# Patient Record
(demographics unavailable — no encounter records)

---

## 2024-10-30 NOTE — HISTORY OF PRESENT ILLNESS
[FreeTextEntry1] : Sandra presents to the office today with h/o midline cystocele, OAB and recurrent UTI. Pt has a RS#3 in place. Reports good support with pessary. Denies pelvic pan, pressure, or vaginal bleeding. Denies any problems with urination or BM. Denies sensation of incomplete bladder emptying with pessary in place. Pt states three weeks ago had painful urination, cloudy urine and lower back pain. Pt states received an epidural last week and lower back pain, and painful urination resolved. Pt states cloudy urine remains and has vaginal itching. Denies fever, chills, dysuria, hematuria or flank pain. Pt was on Gemtesa for OAB but d/alfredo 8/16/24 due to lack of improvement and possible urinary retention. Pt is not able to void when she is evaluated in the office, so a true PVR has not been obtained. Pt had UDS on 9/26/24 which showed large capacity with diminished sensation, positive leaks with cough, no DO or UUI. Pt does not want surgical correction of prolapse or DEBBIE. Pt reports most bothersome issue is recurrent UTIs. Pt takes cranberry tablets and uses Replens.  Chart reviewed: 7/18/24: >100,000 CFU/ml Escherichia coli treated with Bactrim 6/28/24: >100,000 CFU/ml Escherichia coli treated with Bactrim 4/8/24: >100,000 CFU/ml Candida glabrata "Susceptibilities not performed" treated with Bactrim 3/22/24 CT abd/pelvis: No hydronephrosis or obstructing ureteral calculus. Right greater than left mild renal cortical scarring.

## 2024-10-30 NOTE — PHYSICAL EXAM
[No Acute Distress] : in no acute distress [Well developed] : well developed [Well Nourished] : ~L well nourished [Good Hygeine] : demonstrates good hygeine [Oriented x3] : oriented to person, place, and time [Normal Memory] : ~T memory was ~L unimpaired [Normal Mood/Affect] : mood and affect are normal [Cough] : no cough [None] : no CVA tenderness [Warm and Dry] : was warm and dry to touch [Normal Gait] : gait was normal [Vulvar Atrophy] : vulvar atrophy [Labia Majora] : were normal [Labia Minora] : were normal [Normal] : was normal [Normal Appearance] : general appearance was normal [Atrophy] : atrophy [Discharge] : a  ~M vaginal discharge was present [Foul Smelling] : not foul smelling [White] : white [Scant] : there was scant vaginal bleeding

## 2024-10-30 NOTE — PROCEDURE
[Good Fit] : fits well [Refit] : refit is not needed [Erosion] : no evidence of erosion [Erythema] : erythema noted [Discharge] : there is vaginal discharge [Pessary Inserted] : inserted [Pessary Washed] : washed [H2O] : H2O [Mild] : mild bleeding [Resolved w/pressure] : was resolved by applying pressure [FreeTextEntry1] : RS#3

## 2024-10-30 NOTE — DISCUSSION/SUMMARY
[FreeTextEntry1] : Pessary maintenance performed without incident. Mild vaginal bleeding noted on VE, resolved with pressure. Scant vaginal discharge noted. Affirm sent. Will treat based on results.  Cath PVR= 180 (Pt did not void in the office-states last void was about 3 hours ago). Cath UA/UC sent. Will follow up with results and treat as needed. Pt verbalized understanding.  At this time pt states OAB is manageable and would not like any management options. We reviewed recurrent UTI management options. We discussed potential evaluation with renal tract imaging and cystourethroscopy to eval for path such as stone or malignancy. We discussed prevention strategies for recurrent UTI including daily vs post-coital low-dose abx, topical vaginal estrogen if postmenopausal, acidifying urine with cranberry or vit C, methenamine, probiotic use, d-mannose. Risks, benefits; side effects of the meds discussed. Pt would like to try Methenamine and c/w cranberry tablets and Replens.  Methenamine eRx sent to pt's preferred pharmacy. Medication instructions provided. Pt verbalized understanding. Pt to RTO for in office cysto with MD Ballard and follow up in 3 months for pessary maintenance and evaluate effectiveness of Methenamine. Pt verbalized understanding. All questions answered. Instructed to call with any questions or concerns.   [] Cysto [] F/u 3 months pessary maintenance and evaluate Methenamine

## 2024-12-27 NOTE — REASON FOR VISIT
[Initial Visit ___] : an initial visit for [unfilled] [Questionnaire Received] : Patient questionnaire received [Intake Form Reviewed] : Patient intake form with past medical history, surgical history, family history and social history reviewed today [Pelvic Organ Prolapse] : pelvic organ prolapse [Urinary Incontinence] : urinary incontinence [Recurrent Urinary Infections] : recurrent urinary infections

## 2024-12-30 NOTE — PROCEDURE
[Straight Catheterization] : insertion of a straight catheter [Urinary Tract Infection] : a urinary tract infection [Patient] : the patient [___ Fr Straight Tip] : a [unfilled] in Senegalese straight tip catheter [Clear] : clear [No Complications] : no complications [Tolerated Well] : the patient tolerated the procedure well [Post procedure instructions and information given] : Post procedure instructions and information were given and reviewed with patient. [0] : 0 [Good Fit] : fits well [Pessary Out] : removed [None] : no bleeding [Medication Review] : Medicaiton Review: Patient verbalizes understanding of risks and benefits [Fluid Management] : Fluid Management: patient verbalizes understanding 6-10 cups per day [Bowel Management] : Bowel Management: patient verbalizes understanding of daily dietary fiber intake [Bladder Training] : Bladder Training: Patient given information with verbal understanding [Refit] : refit is not needed [Erosion] : no evidence of erosion [Erythema] : no erythema [Discharge] : no vaginal discharge [Infection] : no evidence of infection [FreeTextEntry1] : RS # 3 pessary removed

## 2024-12-30 NOTE — HISTORY OF PRESENT ILLNESS
[Vaginal Wall Prolapse] : no [Rectal Prolapse] : mild [Unable To Restrain Bowel Movement] : mild [Urinary Frequency] : no [Feelings Of Urinary Urgency] : mild [x3+] : nocturia three or more  times a night [Urinary Tract Infection] : no [Constipation Obstructed Defecation] : mild [] : no [Pelvic Pain] : no [Vaginal Pain] : no [FreeTextEntry1] : MELANIA is a 84 year female who presents for midline cystocele, OAB and recurrent UTI. Seen by Dr. Ballard. Prolapse currently managed with RS #3. Last seen in office on 10/30/2024, prescribed Methenamine which she is taking but not with Vitamin C. Reports 12 UTIs this past year, one each month. UTI symptoms of cloudy urine, itching, incomplete emptying. Tried and failed Gemtesa, no real improvement of symptoms and she was not emptying her bladder well with it. C/o urinary frequency, incontinence. She will leak with positional changes moving from sitting to standing. Does not notice an improvement in her symptoms with pessary. She was not cleared to use oral estrogen by cardiologist. She is taking Jardiance.   UDS with large capacity with diminished sensation, + DEBBIE, no DO or UUI on 2024: >100,000 CFU/ml Escherichia coli treated with Bactrim 24: >100,000 CFU/ml Escherichia coli treated with Bactrim 24: >100,000 CFU/ml Candida glabrata "Susceptibilities not performed" treated with Bactrim  3/22/24 CT abd/pelvis: No hydronephrosis or obstructing ureteral calculus. Right greater than left mild renal cortical scarring.   Former smoker, less than 1 pack, quit 40 years ago.   Daytime frequency: Yes Nocturia: 4 episodes per night Urinary urgency: No Leakage of urine with urgency: Yes Leakage of urine with coughing sneezing laughing: Yes Sensation of incomplete bladder emptying: Yes History of frequent urinary tract infections: Yes History of hematuria: No Previous treatment: Methenamine, Pessary, Gemtesa  Vaginal symptoms: Bulge symptoms, Denies pelvic pain Bowel symptoms: Constipation      OB: 1  GYN: LMP , Unknown last pap, H/o normal paps, No estrogen use, H/o fibroids  PMH: Diabetes, Heart disease, HTN, HLD, Obesity  PSH: Hysterectomy, D&C x3, Triple bypass, Cataracts, Knee x2 Allx: Zinc, Band aids

## 2024-12-30 NOTE — PHYSICAL EXAM
[Chaperone Present] : A chaperone was present in the examining room during all aspects of the physical examination [[id number]] : A chaperone was present during the pelvic exam. [No Acute Distress] : in no acute distress [Well developed] : well developed [Well Nourished] : ~L well nourished [Oriented x3] : oriented to person, place, and time [No Edema] : ~T edema was not present [Warm and Dry] : was warm and dry to touch [Vulvar Atrophy] : vulvar atrophy [Labia Majora] : were normal [Labia Minora] : were normal [Normal Appearance] : general appearance was normal [Atrophy] : atrophy [Normal] : no abnormalities [Post Void Residual ____ml] : post void residual was [unfilled] ml [Aa ____] : Aa [unfilled] [Ba ____] : Ba [unfilled] [FreeTextEntry2] :  Estela Becerra [Cough] : no cough [Tenderness] : ~T no ~M abdominal tenderness observed [Distended] : not distended

## 2024-12-30 NOTE — PHYSICAL EXAM
[Chaperone Present] : A chaperone was present in the examining room during all aspects of the physical examination [[id number]] : A chaperone was present during the pelvic exam. [No Acute Distress] : in no acute distress [Well developed] : well developed [Well Nourished] : ~L well nourished [Oriented x3] : oriented to person, place, and time [No Edema] : ~T edema was not present [Warm and Dry] : was warm and dry to touch [Vulvar Atrophy] : vulvar atrophy [Labia Majora] : were normal [Labia Minora] : were normal [Normal Appearance] : general appearance was normal [Atrophy] : atrophy [Normal] : no abnormalities [Post Void Residual ____ml] : post void residual was [unfilled] ml [Aa ____] : Aa [unfilled] [Ba ____] : Ba [unfilled] [FreeTextEntry2] :  Estela Becerra [Cough] : no cough [Tenderness] : ~T no ~M abdominal tenderness observed [Distended] : not distended

## 2024-12-30 NOTE — ADDENDUM
[FreeTextEntry1] : This note was written by Estela Becerra, acting as the  for Dr. Vogel. This note accurately reflects the work and decisions made by Dr. Vogel.

## 2024-12-30 NOTE — DISCUSSION/SUMMARY
[FreeTextEntry1] : MELANIA is a 84 year female who presents for midline cystocele, OAB and recurrent UTI. C/o vaginal itching and irritation. Reports bothersome DEBBIE. Does not believe that pessary is helping her.   [] RS # 3 pessary removed today.  [] Continue Methenamine, add 1g Vit C [] Vaginal estrogen pill - rx sent risks/benefits discussed [] Clobetasol nightly for next month - risks/benefits reviewed   f/u 1 month  All questions answered.

## 2024-12-30 NOTE — PROCEDURE
[Straight Catheterization] : insertion of a straight catheter [Urinary Tract Infection] : a urinary tract infection [Patient] : the patient [___ Fr Straight Tip] : a [unfilled] in Dutch straight tip catheter [Clear] : clear [No Complications] : no complications [Tolerated Well] : the patient tolerated the procedure well [Post procedure instructions and information given] : Post procedure instructions and information were given and reviewed with patient. [0] : 0 [Good Fit] : fits well [Pessary Out] : removed [None] : no bleeding [Medication Review] : Medicaiton Review: Patient verbalizes understanding of risks and benefits [Fluid Management] : Fluid Management: patient verbalizes understanding 6-10 cups per day [Bowel Management] : Bowel Management: patient verbalizes understanding of daily dietary fiber intake [Bladder Training] : Bladder Training: Patient given information with verbal understanding [Refit] : refit is not needed [Erosion] : no evidence of erosion [Erythema] : no erythema [Discharge] : no vaginal discharge [Infection] : no evidence of infection [FreeTextEntry1] : RS # 3 pessary removed

## 2024-12-30 NOTE — OB HISTORY
[Vaginal ___] : [unfilled] vaginal delivery(s) [AB Spont ___] : [unfilled] miscarriage(s) [Definite ___ (Date)] : the last menstrual period was [unfilled] [Abnormal Pap Smear] : normal pap smear [Taking Estrogens] : is not taking estrogen replacement [Sexually Active] : is not sexually active [FreeTextEntry1] : Baby weight 7lbs 11oz

## 2025-01-27 NOTE — HISTORY OF PRESENT ILLNESS
[Vaginal Wall Prolapse] : no [Rectal Prolapse] : mild [Unable To Restrain Bowel Movement] : mild [Urinary Frequency] : no [Feelings Of Urinary Urgency] : mild [x3+] : nocturia three or more  times a night [Urinary Tract Infection] : no [Constipation Obstructed Defecation] : mild [] : no [Pelvic Pain] : no [Vaginal Pain] : no [FreeTextEntry1] : MELANIA is a 85 year female who presents for f/u on midline cystocele, CESAR and RUTIs. Last seen in office on 12/30/2024 where RS # 3 pessary was removed d/t no improvement with it. She felt good first few days without pessary but a few days after she started leaking. Feels that she is the same with and without the pessary, does not want to place it again. Prolapse is not bothersome to her. Continuing methenamine with 1g Vit C. Feels she may have UTI today, she has some dysuria. She used 5 of the Vagifem tablets but stopped since they were falling out and hurt to put in. Using Clobetasol. Tried and failed Gemtesa. Denies DEBBIE. C/o urgency and UUI.

## 2025-01-27 NOTE — END OF VISIT
[FreeTextEntry3] :  I personally performed the services described in the documentation, reviewed the documentation record by the scribe in my presence, and it accurately and completely records my words and action.

## 2025-01-27 NOTE — PHYSICAL EXAM
[Chaperone Present] : A chaperone was present in the examining room during all aspects of the physical examination [[id number]] : A chaperone was present during the pelvic exam. [No Acute Distress] : in no acute distress [Well developed] : well developed [Well Nourished] : ~L well nourished [Oriented x3] : oriented to person, place, and time [No Edema] : ~T edema was not present [Warm and Dry] : was warm and dry to touch [Vulvar Atrophy] : vulvar atrophy [Labia Majora] : were normal [Labia Minora] : were normal [Normal Appearance] : general appearance was normal [Atrophy] : atrophy [Normal] : was normal [Aa ____] : Aa [unfilled] [Ba ____] : Ba [unfilled] [FreeTextEntry2] :  Estela Becerra [Cough] : no cough [Tenderness] : ~T no ~M abdominal tenderness observed [Distended] : not distended

## 2025-01-27 NOTE — PHYSICAL EXAM
[Chaperone Present] : A chaperone was present in the examining room during all aspects of the physical examination [[id number]] : A chaperone was present during the pelvic exam. [No Acute Distress] : in no acute distress [Well developed] : well developed [Well Nourished] : ~L well nourished [Oriented x3] : oriented to person, place, and time [No Edema] : ~T edema was not present [Warm and Dry] : was warm and dry to touch [Vulvar Atrophy] : vulvar atrophy [Labia Majora] : were normal [Labia Minora] : were normal [Normal Appearance] : general appearance was normal [Atrophy] : atrophy [Normal] : was normal [Aa ____] : Aa [unfilled] [Ba ____] : Ba [unfilled] [FreeTextEntry2] :  Estela Becerra [Cough] : no cough [Tenderness] : ~T no ~M abdominal tenderness observed [Distended] : not distended

## 2025-01-27 NOTE — PROCEDURE
[Straight Catheterization] : insertion of a straight catheter [Urinary Tract Infection] : a urinary tract infection [Patient] : the patient [___ Fr Straight Tip] : a [unfilled] in Martiniquais straight tip catheter [None] : there were no complications with the catheter insertion [Cloudy] : cloudy [No Complications] : no complications [Tolerated Well] : the patient tolerated the procedure well [Post procedure instructions and information given] : Post procedure instructions and information were given and reviewed with patient. [0] : 0

## 2025-01-27 NOTE — DISCUSSION/SUMMARY
[FreeTextEntry1] : MELANIA is a 85 year female who presents for f/u on midline cystocele, CESAR and RUTIs. Reports UTI symptoms today. Prolapse not bothersome to her. C/o urgency and UUI. Tried and failed Gemtesa. Vagifem tablet kept falling out.   [] UA, Cx [] PTNS [] Estrace - rx sent risks/benefits discussed [] Continue Methenamine with Vit C   f/u PTNS next appointments All questions answered.

## 2025-01-27 NOTE — PROCEDURE
[Straight Catheterization] : insertion of a straight catheter [Urinary Tract Infection] : a urinary tract infection [Patient] : the patient [___ Fr Straight Tip] : a [unfilled] in Pakistani straight tip catheter [None] : there were no complications with the catheter insertion [Cloudy] : cloudy [No Complications] : no complications [Tolerated Well] : the patient tolerated the procedure well [Post procedure instructions and information given] : Post procedure instructions and information were given and reviewed with patient. [0] : 0

## 2025-06-10 NOTE — DISCUSSION/SUMMARY
[FreeTextEntry1] : MELANIA is a 85 year female who presents for UTI. Here today for cysto but unable to do d/t UTI.   [] UA, Cx  [] Keflex - rx sent risks/benefits discussed   f/u cysto next appointment  All questions answered.

## 2025-06-10 NOTE — HISTORY OF PRESENT ILLNESS
[Vaginal Wall Prolapse] : no [Rectal Prolapse] : mild [Unable To Restrain Bowel Movement] : mild [Urinary Frequency] : no [Feelings Of Urinary Urgency] : mild [x3+] : nocturia three or more  times a night [Urinary Tract Infection] : no [Constipation Obstructed Defecation] : mild [] : no [Pelvic Pain] : no [Vaginal Pain] : no [FreeTextEntry1] : MELANIA is a 85 year female who presents for UTI. Here today for cysto but unable to do d/t UTI. Pt has midline cystocele, CESAR and RUTIs. Failed pessary, prolapse not bothersome to her. Taking Methenamine with Vit C for UTI ppx and switched to estrace as vagifem tablet was falling out. Failed Gemtesa. Underwent PTNS, session #12 done on 05/12/2025 with great improvement in sx.  Positive Urine Cxs 04/28/2025 - 10,000 - 49,000 CFU/mL Escherichia coli, >100,000 CFU/ml Streptococcus agalactiae (Group B) 03/24/2025 - 50,000 - 99,000 CFU/mL Streptococcus agalactiae (Group B) 01/27/2025 - >100,000 CFU/ml Escherichia coli  CT Urogram 04/10/2025 - Despite incomplete bladder distention there is circumferential bladder wall thickening with stranding within the surrounding fat suggesting inflammation/infectious process.  UDS with large capacity with diminished sensation, +DEBBIE, no DO or UUI on 09/26/2024

## 2025-06-10 NOTE — PROCEDURE
[Straight Catheterization] : insertion of a straight catheter [Urinary Tract Infection] : a urinary tract infection [Patient] : the patient [___ Fr Straight Tip] : a [unfilled] in Lao straight tip catheter [None] : there were no complications with the catheter insertion [Cloudy] : cloudy [No Complications] : no complications [Tolerated Well] : the patient tolerated the procedure well [Post procedure instructions and information given] : Post procedure instructions and information were given and reviewed with patient. [0] : 0

## 2025-06-10 NOTE — PHYSICAL EXAM
[MA] : MA [No Acute Distress] : in no acute distress [Well developed] : well developed [Well Nourished] : ~L well nourished [Oriented x3] : oriented to person, place, and time [No Edema] : ~T edema was not present [Warm and Dry] : was warm and dry to touch [Vulvar Atrophy] : vulvar atrophy [Labia Majora] : were normal [Labia Minora] : were normal [Normal Appearance] : general appearance was normal [Atrophy] : atrophy [Normal] : was normal [FreeTextEntry2] :  Estela Becerra  [Cough] : no cough [Tenderness] : ~T no ~M abdominal tenderness observed [Distended] : not distended

## 2025-06-20 NOTE — HISTORY OF PRESENT ILLNESS
[FreeTextEntry1] : 83 year old F seen 10/05/2023  with complaint of ureteral stone. This began when the patient presented to  with gross hematuria. CT showed 3 mm LEFT ureteral stone, and she was discharged after pain was controlled. She reports mild crampy LEFT groin pain at that time, now resolved. No prior history of stones. no stones in family.  She is seeing Dr Ballard for OAB.  No family history contributory to kidney stones.  11/02/2023: Patient presents for follow up. She had a repeat CT which showed passage of the left UVJ stone. Patient notes some right hip pain reports this is movement related worse when she gets up from a sitting position. Reports she finished her antibiotics course 2 days ago and that her urinary symptoms are resolving. Notes some hematuria but notes it is improving.  No dysuria, no hesitancy, no straining. No fevers, no chills, no nausea, no vomiting, no flank pain.  06/20/2025: Patient presents for follow up. She had been managed with OAB with Dr Vogel. PTNS worked well but then she developed UTI and symptoms worsened again.

## 2025-06-20 NOTE — PHYSICAL EXAM
[General Appearance - Well Developed] : well developed [General Appearance - Well Nourished] : well nourished [Normal Appearance] : normal appearance [Well Groomed] : well groomed [General Appearance - In No Acute Distress] : no acute distress [Abdomen Soft] : soft [Abdomen Tenderness] : non-tender [Costovertebral Angle Tenderness] : no ~M costovertebral angle tenderness [Urinary Bladder Findings] : the bladder was normal on palpation [Edema] : no peripheral edema [] : no respiratory distress [Respiration, Rhythm And Depth] : normal respiratory rhythm and effort [Exaggerated Use Of Accessory Muscles For Inspiration] : no accessory muscle use [Normal Station and Gait] : the gait and station were normal for the patient's age

## 2025-06-20 NOTE — REVIEW OF SYSTEMS
[Feeling Tired] : feeling tired [Eyesight Problems] : eyesight problems [Other ___] : [unfilled] [Seen by urologist before (Name)  ___] : Previously seen by a urologist: [unfilled] [Urine Infection (bladder/kidney)] : bladder/kidney infection [Blood in urine that you can see] : blood visible in urine [Wake up at night to urinate  How many times?  ___] : wakes up to urinate [unfilled] times during the night [Slow urine stream] : slow urine stream [Hot Flashes] : hot flashes [Negative] : Heme/Lymph [FreeTextEntry2] : High blood pressure

## 2025-06-20 NOTE — ASSESSMENT
[FreeTextEntry1] : 82 yo F with OAB and frequent UTI.  For OAB, she will return to Dr Blackwell for more PTNS. Discussed with Dr Vogel. For frequent UTI, discussed D-mannose, hiprex, or abx ppx. She would like to try hipprex.  RTO in 6-12 weeks for sx check.